# Patient Record
Sex: MALE | Race: WHITE | Employment: OTHER | URBAN - NONMETROPOLITAN AREA
[De-identification: names, ages, dates, MRNs, and addresses within clinical notes are randomized per-mention and may not be internally consistent; named-entity substitution may affect disease eponyms.]

---

## 2022-03-18 ENCOUNTER — HOSPITAL ENCOUNTER (OUTPATIENT)
Age: 77
Setting detail: OBSERVATION
Discharge: HOME | End: 2022-03-21
Attending: ORTHOPAEDIC SURGERY | Admitting: ORTHOPAEDIC SURGERY
Payer: MEDICARE

## 2022-03-18 DIAGNOSIS — M54.16 SPINAL STENOSIS OF LUMBAR REGION WITH RADICULOPATHY: ICD-10-CM

## 2022-03-18 DIAGNOSIS — M48.061 SPINAL STENOSIS OF LUMBAR REGION WITH RADICULOPATHY: ICD-10-CM

## 2022-03-18 DIAGNOSIS — M54.9 INTRACTABLE BACK PAIN: Primary | ICD-10-CM

## 2022-03-18 LAB
ANION GAP SERPL CALCULATED.3IONS-SCNC: 14 MEQ/L (ref 8–16)
BUN BLDV-MCNC: 19 MG/DL (ref 7–22)
CALCIUM SERPL-MCNC: 9.3 MG/DL (ref 8.5–10.5)
CHLORIDE BLD-SCNC: 97 MEQ/L (ref 98–111)
CO2: 21 MEQ/L (ref 23–33)
CREAT SERPL-MCNC: 0.8 MG/DL (ref 0.4–1.2)
GFR SERPL CREATININE-BSD FRML MDRD: > 90 ML/MIN/1.73M2
GLUCOSE BLD-MCNC: 117 MG/DL (ref 70–108)
POTASSIUM SERPL-SCNC: 3.8 MEQ/L (ref 3.5–5.2)
SODIUM BLD-SCNC: 132 MEQ/L (ref 135–145)

## 2022-03-18 PROCEDURE — 80048 BASIC METABOLIC PNL TOTAL CA: CPT

## 2022-03-18 PROCEDURE — 36415 COLL VENOUS BLD VENIPUNCTURE: CPT

## 2022-03-18 PROCEDURE — G0379 DIRECT REFER HOSPITAL OBSERV: HCPCS

## 2022-03-18 PROCEDURE — 2580000003 HC RX 258: Performed by: PHYSICIAN ASSISTANT

## 2022-03-18 PROCEDURE — 6370000000 HC RX 637 (ALT 250 FOR IP): Performed by: PHYSICIAN ASSISTANT

## 2022-03-18 PROCEDURE — G0378 HOSPITAL OBSERVATION PER HR: HCPCS

## 2022-03-18 RX ORDER — OXYCODONE HYDROCHLORIDE 5 MG/1
10 TABLET ORAL EVERY 4 HOURS PRN
Status: DISCONTINUED | OUTPATIENT
Start: 2022-03-18 | End: 2022-03-21 | Stop reason: HOSPADM

## 2022-03-18 RX ORDER — OXYCODONE HYDROCHLORIDE AND ACETAMINOPHEN 5; 325 MG/1; MG/1
1 TABLET ORAL EVERY 4 HOURS PRN
Status: ON HOLD | COMMUNITY
End: 2022-03-21 | Stop reason: SDUPTHER

## 2022-03-18 RX ORDER — POLYETHYLENE GLYCOL 3350 17 G/17G
17 POWDER, FOR SOLUTION ORAL DAILY
Status: DISCONTINUED | OUTPATIENT
Start: 2022-03-18 | End: 2022-03-21 | Stop reason: HOSPADM

## 2022-03-18 RX ORDER — HYDROCHLOROTHIAZIDE 25 MG/1
12.5 TABLET ORAL DAILY
Status: DISCONTINUED | OUTPATIENT
Start: 2022-03-18 | End: 2022-03-21 | Stop reason: HOSPADM

## 2022-03-18 RX ORDER — SIMVASTATIN 10 MG
10 TABLET ORAL NIGHTLY
COMMUNITY

## 2022-03-18 RX ORDER — OXYCODONE HYDROCHLORIDE 5 MG/1
5 TABLET ORAL EVERY 4 HOURS PRN
Status: DISCONTINUED | OUTPATIENT
Start: 2022-03-18 | End: 2022-03-21 | Stop reason: HOSPADM

## 2022-03-18 RX ORDER — LISINOPRIL 10 MG/1
10 TABLET ORAL DAILY
Status: DISCONTINUED | OUTPATIENT
Start: 2022-03-18 | End: 2022-03-21 | Stop reason: HOSPADM

## 2022-03-18 RX ORDER — ATORVASTATIN CALCIUM 10 MG/1
10 TABLET, FILM COATED ORAL DAILY
Status: DISCONTINUED | OUTPATIENT
Start: 2022-03-18 | End: 2022-03-21 | Stop reason: HOSPADM

## 2022-03-18 RX ORDER — ONDANSETRON 4 MG/1
4 TABLET, ORALLY DISINTEGRATING ORAL EVERY 8 HOURS PRN
Status: DISCONTINUED | OUTPATIENT
Start: 2022-03-18 | End: 2022-03-21 | Stop reason: HOSPADM

## 2022-03-18 RX ORDER — SODIUM CHLORIDE 9 MG/ML
25 INJECTION, SOLUTION INTRAVENOUS PRN
Status: DISCONTINUED | OUTPATIENT
Start: 2022-03-18 | End: 2022-03-21 | Stop reason: HOSPADM

## 2022-03-18 RX ORDER — CYCLOBENZAPRINE HCL 10 MG
10 TABLET ORAL 3 TIMES DAILY PRN
Status: ON HOLD | COMMUNITY
End: 2022-03-21 | Stop reason: SDUPTHER

## 2022-03-18 RX ORDER — LISINOPRIL AND HYDROCHLOROTHIAZIDE 12.5; 1 MG/1; MG/1
1 TABLET ORAL DAILY
COMMUNITY

## 2022-03-18 RX ORDER — MORPHINE SULFATE 2 MG/ML
2 INJECTION, SOLUTION INTRAMUSCULAR; INTRAVENOUS
Status: DISCONTINUED | OUTPATIENT
Start: 2022-03-18 | End: 2022-03-21 | Stop reason: HOSPADM

## 2022-03-18 RX ORDER — POLYETHYLENE GLYCOL 3350 17 G/17G
17 POWDER, FOR SOLUTION ORAL DAILY PRN
Status: DISCONTINUED | OUTPATIENT
Start: 2022-03-18 | End: 2022-03-21 | Stop reason: HOSPADM

## 2022-03-18 RX ORDER — CYCLOBENZAPRINE HCL 10 MG
10 TABLET ORAL 3 TIMES DAILY PRN
Status: DISCONTINUED | OUTPATIENT
Start: 2022-03-18 | End: 2022-03-21 | Stop reason: HOSPADM

## 2022-03-18 RX ORDER — SODIUM CHLORIDE 0.9 % (FLUSH) 0.9 %
5-40 SYRINGE (ML) INJECTION EVERY 12 HOURS SCHEDULED
Status: DISCONTINUED | OUTPATIENT
Start: 2022-03-18 | End: 2022-03-21 | Stop reason: HOSPADM

## 2022-03-18 RX ORDER — ONDANSETRON 2 MG/ML
4 INJECTION INTRAMUSCULAR; INTRAVENOUS EVERY 6 HOURS PRN
Status: DISCONTINUED | OUTPATIENT
Start: 2022-03-18 | End: 2022-03-21 | Stop reason: HOSPADM

## 2022-03-18 RX ORDER — LISINOPRIL AND HYDROCHLOROTHIAZIDE 12.5; 1 MG/1; MG/1
1 TABLET ORAL DAILY
Status: DISCONTINUED | OUTPATIENT
Start: 2022-03-18 | End: 2022-03-18 | Stop reason: CLARIF

## 2022-03-18 RX ORDER — ACETAMINOPHEN 325 MG/1
650 TABLET ORAL EVERY 6 HOURS
Status: DISCONTINUED | OUTPATIENT
Start: 2022-03-18 | End: 2022-03-21 | Stop reason: HOSPADM

## 2022-03-18 RX ORDER — SODIUM CHLORIDE 0.9 % (FLUSH) 0.9 %
5-40 SYRINGE (ML) INJECTION PRN
Status: DISCONTINUED | OUTPATIENT
Start: 2022-03-18 | End: 2022-03-21 | Stop reason: HOSPADM

## 2022-03-18 RX ORDER — IBUPROFEN 400 MG/1
400 TABLET ORAL EVERY 6 HOURS PRN
COMMUNITY

## 2022-03-18 RX ORDER — MORPHINE SULFATE 4 MG/ML
4 INJECTION, SOLUTION INTRAMUSCULAR; INTRAVENOUS
Status: DISCONTINUED | OUTPATIENT
Start: 2022-03-18 | End: 2022-03-21 | Stop reason: HOSPADM

## 2022-03-18 RX ADMIN — POLYETHYLENE GLYCOL 3350 17 G: 17 POWDER, FOR SOLUTION ORAL at 21:37

## 2022-03-18 RX ADMIN — OXYCODONE 10 MG: 5 TABLET ORAL at 23:07

## 2022-03-18 RX ADMIN — ATORVASTATIN CALCIUM 10 MG: 10 TABLET, FILM COATED ORAL at 21:37

## 2022-03-18 RX ADMIN — SODIUM CHLORIDE, PRESERVATIVE FREE 10 ML: 5 INJECTION INTRAVENOUS at 21:37

## 2022-03-18 ASSESSMENT — ENCOUNTER SYMPTOMS
BACK PAIN: 1
GASTROINTESTINAL NEGATIVE: 1
RESPIRATORY NEGATIVE: 1

## 2022-03-18 ASSESSMENT — PAIN DESCRIPTION - PAIN TYPE: TYPE: ACUTE PAIN;CHRONIC PAIN

## 2022-03-18 ASSESSMENT — PAIN DESCRIPTION - ORIENTATION: ORIENTATION: LOWER;MID

## 2022-03-18 ASSESSMENT — PAIN SCALES - GENERAL
PAINLEVEL_OUTOF10: 8
PAINLEVEL_OUTOF10: 8
PAINLEVEL_OUTOF10: 7

## 2022-03-18 ASSESSMENT — PAIN - FUNCTIONAL ASSESSMENT: PAIN_FUNCTIONAL_ASSESSMENT: ACTIVITIES ARE NOT PREVENTED

## 2022-03-18 ASSESSMENT — PAIN DESCRIPTION - PROGRESSION: CLINICAL_PROGRESSION: NOT CHANGED

## 2022-03-18 ASSESSMENT — PAIN DESCRIPTION - FREQUENCY: FREQUENCY: CONTINUOUS

## 2022-03-18 ASSESSMENT — PAIN DESCRIPTION - DESCRIPTORS: DESCRIPTORS: ACHING

## 2022-03-18 ASSESSMENT — PAIN DESCRIPTION - ONSET: ONSET: ON-GOING

## 2022-03-18 ASSESSMENT — PAIN DESCRIPTION - LOCATION: LOCATION: BACK

## 2022-03-19 ENCOUNTER — APPOINTMENT (OUTPATIENT)
Dept: MRI IMAGING | Age: 77
End: 2022-03-19
Attending: ORTHOPAEDIC SURGERY
Payer: MEDICARE

## 2022-03-19 PROCEDURE — G0378 HOSPITAL OBSERVATION PER HR: HCPCS

## 2022-03-19 PROCEDURE — 6370000000 HC RX 637 (ALT 250 FOR IP): Performed by: PHYSICIAN ASSISTANT

## 2022-03-19 PROCEDURE — 2580000003 HC RX 258: Performed by: PHYSICIAN ASSISTANT

## 2022-03-19 PROCEDURE — 6360000002 HC RX W HCPCS: Performed by: PHYSICIAN ASSISTANT

## 2022-03-19 PROCEDURE — 96372 THER/PROPH/DIAG INJ SC/IM: CPT

## 2022-03-19 PROCEDURE — 72148 MRI LUMBAR SPINE W/O DYE: CPT

## 2022-03-19 RX ADMIN — POLYETHYLENE GLYCOL 3350 17 G: 17 POWDER, FOR SOLUTION ORAL at 09:24

## 2022-03-19 RX ADMIN — ATORVASTATIN CALCIUM 10 MG: 10 TABLET, FILM COATED ORAL at 09:24

## 2022-03-19 RX ADMIN — ACETAMINOPHEN 650 MG: 325 TABLET ORAL at 01:41

## 2022-03-19 RX ADMIN — OXYCODONE 5 MG: 5 TABLET ORAL at 16:01

## 2022-03-19 RX ADMIN — SODIUM CHLORIDE, PRESERVATIVE FREE 10 ML: 5 INJECTION INTRAVENOUS at 20:32

## 2022-03-19 RX ADMIN — ACETAMINOPHEN 650 MG: 325 TABLET ORAL at 09:24

## 2022-03-19 RX ADMIN — NALOXEGOL OXALATE 12.5 MG: 12.5 TABLET, FILM COATED ORAL at 09:24

## 2022-03-19 RX ADMIN — SODIUM CHLORIDE, PRESERVATIVE FREE 10 ML: 5 INJECTION INTRAVENOUS at 09:25

## 2022-03-19 RX ADMIN — LISINOPRIL 10 MG: 10 TABLET ORAL at 09:24

## 2022-03-19 RX ADMIN — OXYCODONE 5 MG: 5 TABLET ORAL at 20:32

## 2022-03-19 RX ADMIN — CYCLOBENZAPRINE 10 MG: 10 TABLET, FILM COATED ORAL at 09:24

## 2022-03-19 RX ADMIN — ENOXAPARIN SODIUM 40 MG: 100 INJECTION SUBCUTANEOUS at 09:24

## 2022-03-19 RX ADMIN — HYDROCHLOROTHIAZIDE 12.5 MG: 25 TABLET ORAL at 09:24

## 2022-03-19 RX ADMIN — ACETAMINOPHEN 650 MG: 325 TABLET ORAL at 19:19

## 2022-03-19 RX ADMIN — ACETAMINOPHEN 650 MG: 325 TABLET ORAL at 13:30

## 2022-03-19 ASSESSMENT — PAIN DESCRIPTION - DESCRIPTORS
DESCRIPTORS: ACHING
DESCRIPTORS: ACHING

## 2022-03-19 ASSESSMENT — PAIN DESCRIPTION - DIRECTION: RADIATING_TOWARDS: DOWN LEG

## 2022-03-19 ASSESSMENT — PAIN DESCRIPTION - LOCATION
LOCATION: HIP
LOCATION: BACK

## 2022-03-19 ASSESSMENT — PAIN DESCRIPTION - PROGRESSION
CLINICAL_PROGRESSION: NOT CHANGED

## 2022-03-19 ASSESSMENT — PAIN DESCRIPTION - FREQUENCY
FREQUENCY: INTERMITTENT
FREQUENCY: INTERMITTENT

## 2022-03-19 ASSESSMENT — PAIN DESCRIPTION - ONSET
ONSET: ON-GOING
ONSET: ON-GOING

## 2022-03-19 ASSESSMENT — PAIN SCALES - GENERAL
PAINLEVEL_OUTOF10: 5
PAINLEVEL_OUTOF10: 6
PAINLEVEL_OUTOF10: 2
PAINLEVEL_OUTOF10: 3
PAINLEVEL_OUTOF10: 4
PAINLEVEL_OUTOF10: 5
PAINLEVEL_OUTOF10: 6
PAINLEVEL_OUTOF10: 3
PAINLEVEL_OUTOF10: 1
PAINLEVEL_OUTOF10: 6
PAINLEVEL_OUTOF10: 6

## 2022-03-19 ASSESSMENT — PAIN - FUNCTIONAL ASSESSMENT: PAIN_FUNCTIONAL_ASSESSMENT: ACTIVITIES ARE NOT PREVENTED

## 2022-03-19 ASSESSMENT — PAIN DESCRIPTION - ORIENTATION
ORIENTATION: RIGHT

## 2022-03-19 ASSESSMENT — PAIN DESCRIPTION - PAIN TYPE
TYPE: ACUTE PAIN

## 2022-03-19 NOTE — PROGRESS NOTES
Department of Orthopedic Surgery  Spine Service  Progress Note        Subjective:   3/19/22  Salbador is resting in bed. Pain better controlled this morning with pain medications. Pain worse with standing and ambulation. Await MRI LS completion to recommend appropriate injection for patient. Vitals  VITALS:  /77   Pulse 86   Temp 98.4 °F (36.9 °C) (Oral)   Resp 18   Ht 5' 10\" (1.778 m)   Wt 174 lb (78.9 kg)   SpO2 96%   BMI 24.97 kg/m²   24HR INTAKE/OUTPUT:    Intake/Output Summary (Last 24 hours) at 3/19/2022 8294  Last data filed at 3/19/2022 0346  Gross per 24 hour   Intake 555 ml   Output 500 ml   Net 55 ml     URINARY CATHETER OUTPUT (Bartholomew):     DRAIN/TUBE OUTPUT:     VENT SETTINGS:  Vent Information  SpO2: 96 %  Additional Respiratory  Assessments  Pulse: 86  Resp: 18  SpO2: 96 %        PHYSICAL EXAM:    Orientation:  alert and oriented to person, place and time    Lower Extremity Motor :    quadriceps, plantarflexion 5/5 bilateral  extensor hallucis longus, dorsiflexion 4+/5 right & 5/5 left  Lower Extremity Sensory:  Intact L1-S1    ABNORMAL EXAM FINDINGS:  none    LABS:  No results for input(s): HGB, HCT in the last 72 hours. ASSESSMENT AND PLAN:    Intractable LBP with RLE radiculopathy    1:  Monitor labs and vitals  2:  Activity Level:  OOB with therapy and staff  3:  Pain Control:  Controlled, continue current regimen  4:  Discharge Planning:  Pending clinical course.      Electronically signed by Avila Fernandez PA-C on 3/19/2022 at 9:20 AM

## 2022-03-19 NOTE — PLAN OF CARE
Care plan initiated. Care plan reviewed with patient. Patient verbalizes understanding of the plan of care and contribute to goal setting.

## 2022-03-19 NOTE — PLAN OF CARE
Problem: Pain:    Goal: Control of acute pain    Description: Control of acute pain    Outcome: Ongoing     Pt pain controlled with medications/ambulation and informing staff ahead of time when pain is noticeable to prevent pain from becoming worse.

## 2022-03-20 PROCEDURE — 94760 N-INVAS EAR/PLS OXIMETRY 1: CPT

## 2022-03-20 PROCEDURE — 2580000003 HC RX 258: Performed by: PHYSICIAN ASSISTANT

## 2022-03-20 PROCEDURE — 6370000000 HC RX 637 (ALT 250 FOR IP): Performed by: PHYSICIAN ASSISTANT

## 2022-03-20 PROCEDURE — G0378 HOSPITAL OBSERVATION PER HR: HCPCS

## 2022-03-20 PROCEDURE — 96372 THER/PROPH/DIAG INJ SC/IM: CPT

## 2022-03-20 PROCEDURE — 6360000002 HC RX W HCPCS: Performed by: PHYSICIAN ASSISTANT

## 2022-03-20 RX ADMIN — NALOXEGOL OXALATE 12.5 MG: 12.5 TABLET, FILM COATED ORAL at 09:11

## 2022-03-20 RX ADMIN — SODIUM CHLORIDE, PRESERVATIVE FREE 10 ML: 5 INJECTION INTRAVENOUS at 09:18

## 2022-03-20 RX ADMIN — SODIUM CHLORIDE, PRESERVATIVE FREE 10 ML: 5 INJECTION INTRAVENOUS at 19:38

## 2022-03-20 RX ADMIN — ACETAMINOPHEN 650 MG: 325 TABLET ORAL at 08:03

## 2022-03-20 RX ADMIN — ACETAMINOPHEN 650 MG: 325 TABLET ORAL at 13:28

## 2022-03-20 RX ADMIN — ENOXAPARIN SODIUM 40 MG: 100 INJECTION SUBCUTANEOUS at 09:13

## 2022-03-20 RX ADMIN — HYDROCHLOROTHIAZIDE 12.5 MG: 25 TABLET ORAL at 09:12

## 2022-03-20 RX ADMIN — POLYETHYLENE GLYCOL 3350 17 G: 17 POWDER, FOR SOLUTION ORAL at 09:10

## 2022-03-20 RX ADMIN — ATORVASTATIN CALCIUM 10 MG: 10 TABLET, FILM COATED ORAL at 09:13

## 2022-03-20 RX ADMIN — ACETAMINOPHEN 650 MG: 325 TABLET ORAL at 01:29

## 2022-03-20 RX ADMIN — OXYCODONE 10 MG: 5 TABLET ORAL at 04:15

## 2022-03-20 RX ADMIN — ACETAMINOPHEN 650 MG: 325 TABLET ORAL at 19:37

## 2022-03-20 RX ADMIN — LISINOPRIL 10 MG: 10 TABLET ORAL at 09:12

## 2022-03-20 ASSESSMENT — PAIN DESCRIPTION - PAIN TYPE
TYPE: CHRONIC PAIN
TYPE: CHRONIC PAIN
TYPE: ACUTE PAIN
TYPE: ACUTE PAIN

## 2022-03-20 ASSESSMENT — PAIN DESCRIPTION - PROGRESSION

## 2022-03-20 ASSESSMENT — PAIN SCALES - GENERAL
PAINLEVEL_OUTOF10: 3
PAINLEVEL_OUTOF10: 4
PAINLEVEL_OUTOF10: 3
PAINLEVEL_OUTOF10: 5
PAINLEVEL_OUTOF10: 3
PAINLEVEL_OUTOF10: 7
PAINLEVEL_OUTOF10: 4
PAINLEVEL_OUTOF10: 3
PAINLEVEL_OUTOF10: 5
PAINLEVEL_OUTOF10: 6

## 2022-03-20 ASSESSMENT — PAIN DESCRIPTION - LOCATION
LOCATION: BACK
LOCATION: HIP

## 2022-03-20 ASSESSMENT — PAIN DESCRIPTION - ORIENTATION
ORIENTATION: RIGHT

## 2022-03-20 ASSESSMENT — PAIN DESCRIPTION - FREQUENCY: FREQUENCY: INTERMITTENT

## 2022-03-20 ASSESSMENT — PAIN DESCRIPTION - DESCRIPTORS: DESCRIPTORS: DISCOMFORT

## 2022-03-20 NOTE — PROGRESS NOTES
Department of Orthopedic Surgery  Spine Service  Progress Note        Subjective:   3/19/22  Salbador is resting in bed. Pain better controlled this morning with pain medications. Pain worse with standing and ambulation. Await MRI LS completion to recommend appropriate injection for patient. 3/20/22  Salbador is resting in bed. No overnight changes. Continued RLE radiculopathy worsened with standing and ambulation. Patient wants to trial an injection. MRI completed last night:  1. Mild to moderate spinal canal stenosis with moderate severity bilateral foraminal stenosis at the L2-3 level due to degenerative changes. 2. Mild spinal canal stenosis with moderate severity left foraminal stenosis at the L3-4 level. 3. Severe right foraminal stenosis at the L4-5 level. Vitals  VITALS:  BP (!) 142/65   Pulse 83   Temp 97.7 °F (36.5 °C) (Oral)   Resp 14   Ht 5' 10\" (1.778 m)   Wt 174 lb (78.9 kg)   SpO2 97%   BMI 24.97 kg/m²   24HR INTAKE/OUTPUT:      Intake/Output Summary (Last 24 hours) at 3/20/2022 2038  Last data filed at 3/20/2022 9384  Gross per 24 hour   Intake 1135 ml   Output 750 ml   Net 385 ml     URINARY CATHETER OUTPUT (Bartholomew):     DRAIN/TUBE OUTPUT:     VENT SETTINGS:  Vent Information  SpO2: 97 %  Additional Respiratory  Assessments  Pulse: 83  Resp: 14  SpO2: 97 %  Oral Care: Teeth brushed,Mouthwash        PHYSICAL EXAM:    Orientation:  alert and oriented to person, place and time    Lower Extremity Motor :    quadriceps, plantarflexion 5/5 bilateral  extensor hallucis longus, dorsiflexion 4+/5 right & 5/5 left  Lower Extremity Sensory:  Intact L1-S1    ABNORMAL EXAM FINDINGS:  none    LABS:  No results for input(s): HGB, HCT in the last 72 hours.     ASSESSMENT AND PLAN:    Intractable LBP with RLE radiculopathy    1:  Monitor labs and vitals  2:  Activity Level:  OOB with therapy and staff  3:  Pain Control:  Controlled, continue current regimen  4:  Discharge Planning:  Pending clinical course.    5:  Will order a right L4-L5 EFRNB with IR    Electronically signed by Santy Issa PA-C on 3/20/2022 at 9:27 AM

## 2022-03-20 NOTE — PLAN OF CARE
Problem: GI    Goal: No bowel complications    Outcome: Ongoing    Pt with previous complaints of constipation. Pt administered scheduled bowel regimen. Pt awaiting bowel movement while frequently using restroom.

## 2022-03-20 NOTE — PLAN OF CARE
Problem: Pain:  Goal: Pain level will decrease  Description: Pain level will decrease  Outcome: Ongoing  Note: Patient continues to c/o pain at 5 or 6. Patient repositioned and rest effective. Routine tylenol effective. Goal: Control of acute pain  Description: Control of acute pain  Outcome: Ongoing  Goal: Control of chronic pain  Description: Control of chronic pain  Outcome: Ongoing     Problem: Falls - Risk of:  Goal: Will remain free from falls  Description: Will remain free from falls  Outcome: Ongoing  Note: Patient ambulates with slow steady gait. Patient steady with walker. Goal: Absence of physical injury  Description: Absence of physical injury  Outcome: Ongoing     Problem: Daily Care:  Goal: Daily care needs are met  Description: Daily care needs are met  Outcome: Ongoing     Problem: Discharge Planning:  Goal: Patients continuum of care needs are met  Description: Patients continuum of care needs are met  Outcome: Ongoing     Problem: Neurological  Goal: Maximum potential motor/sensory/cognitive function  Outcome: Ongoing  Note: Pending injections for 03/21/22     Problem: Cardiovascular  Goal: No DVT, peripheral vascular complications  Outcome: Ongoing     Problem: Respiratory  Goal: O2 Sat > 90%  Outcome: Ongoing     Problem: GI  Goal: No bowel complications  2/98/3743 1511 by Manny Nugent LPN  Outcome: Ongoing  Note: Patient given bowel meds and no BM noted this shift. Active BS noted.   3/20/2022 8669 by Joana Cantu RN  Outcome: Ongoing     Problem:   Goal: Adequate urinary output  Outcome: Ongoing     Problem: Nutrition  Goal: Optimal nutrition therapy  Outcome: Ongoing     Problem: Skin Integrity/Risk  Goal: No skin breakdown during hospitalization  Outcome: Ongoing     Problem: Musculor/Skeletal Functional Status  Goal: Highest potential functional level  Outcome: Ongoing  Care plan reviewed with patient and  Patient  verbalizes understanding of the plan of care and contribute to goal setting.

## 2022-03-21 ENCOUNTER — APPOINTMENT (OUTPATIENT)
Dept: INTERVENTIONAL RADIOLOGY/VASCULAR | Age: 77
End: 2022-03-21
Attending: ORTHOPAEDIC SURGERY
Payer: MEDICARE

## 2022-03-21 VITALS
RESPIRATION RATE: 18 BRPM | TEMPERATURE: 97.8 F | OXYGEN SATURATION: 93 % | SYSTOLIC BLOOD PRESSURE: 140 MMHG | WEIGHT: 174 LBS | HEART RATE: 92 BPM | HEIGHT: 70 IN | DIASTOLIC BLOOD PRESSURE: 74 MMHG | BODY MASS INDEX: 24.91 KG/M2

## 2022-03-21 PROCEDURE — 2709999900 IR FLUORO GUIDED LUMBAR PUNCTURE THERAPY

## 2022-03-21 PROCEDURE — G0378 HOSPITAL OBSERVATION PER HR: HCPCS

## 2022-03-21 PROCEDURE — 6370000000 HC RX 637 (ALT 250 FOR IP): Performed by: PHYSICIAN ASSISTANT

## 2022-03-21 PROCEDURE — 6360000002 HC RX W HCPCS: Performed by: RADIOLOGY

## 2022-03-21 PROCEDURE — 64483 NJX AA&/STRD TFRM EPI L/S 1: CPT

## 2022-03-21 PROCEDURE — 96372 THER/PROPH/DIAG INJ SC/IM: CPT

## 2022-03-21 PROCEDURE — 97530 THERAPEUTIC ACTIVITIES: CPT

## 2022-03-21 PROCEDURE — 97116 GAIT TRAINING THERAPY: CPT

## 2022-03-21 PROCEDURE — 96374 THER/PROPH/DIAG INJ IV PUSH: CPT

## 2022-03-21 PROCEDURE — 2580000003 HC RX 258: Performed by: PHYSICIAN ASSISTANT

## 2022-03-21 PROCEDURE — 62323 NJX INTERLAMINAR LMBR/SAC: CPT

## 2022-03-21 PROCEDURE — 6360000004 HC RX CONTRAST MEDICATION: Performed by: RADIOLOGY

## 2022-03-21 PROCEDURE — 6360000002 HC RX W HCPCS: Performed by: PHYSICIAN ASSISTANT

## 2022-03-21 PROCEDURE — 2500000003 HC RX 250 WO HCPCS: Performed by: RADIOLOGY

## 2022-03-21 PROCEDURE — 97161 PT EVAL LOW COMPLEX 20 MIN: CPT

## 2022-03-21 RX ORDER — SODIUM PHOSPHATE, DIBASIC AND SODIUM PHOSPHATE, MONOBASIC 7; 19 G/133ML; G/133ML
1 ENEMA RECTAL
Status: DISCONTINUED | OUTPATIENT
Start: 2022-03-21 | End: 2022-03-21 | Stop reason: HOSPADM

## 2022-03-21 RX ORDER — POLYETHYLENE GLYCOL 3350 17 G/17G
17 POWDER, FOR SOLUTION ORAL DAILY
Qty: 527 G | Refills: 0 | Status: SHIPPED | OUTPATIENT
Start: 2022-03-21 | End: 2022-04-20

## 2022-03-21 RX ORDER — CYCLOBENZAPRINE HCL 10 MG
10 TABLET ORAL 3 TIMES DAILY PRN
Qty: 50 TABLET | Refills: 0 | Status: SHIPPED | OUTPATIENT
Start: 2022-03-21

## 2022-03-21 RX ORDER — SENNA AND DOCUSATE SODIUM 50; 8.6 MG/1; MG/1
2 TABLET, FILM COATED ORAL DAILY PRN
Status: DISCONTINUED | OUTPATIENT
Start: 2022-03-21 | End: 2022-03-21 | Stop reason: HOSPADM

## 2022-03-21 RX ORDER — BISACODYL 10 MG
10 SUPPOSITORY, RECTAL RECTAL ONCE
Status: DISCONTINUED | OUTPATIENT
Start: 2022-03-21 | End: 2022-03-21 | Stop reason: HOSPADM

## 2022-03-21 RX ORDER — BUPIVACAINE HYDROCHLORIDE 2.5 MG/ML
5 INJECTION, SOLUTION EPIDURAL; INFILTRATION; INTRACAUDAL ONCE
Status: COMPLETED | OUTPATIENT
Start: 2022-03-21 | End: 2022-03-21

## 2022-03-21 RX ORDER — OXYCODONE HYDROCHLORIDE AND ACETAMINOPHEN 5; 325 MG/1; MG/1
1 TABLET ORAL EVERY 4 HOURS PRN
Qty: 42 TABLET | Refills: 0 | Status: SHIPPED | OUTPATIENT
Start: 2022-03-21 | End: 2022-03-28

## 2022-03-21 RX ORDER — DEXAMETHASONE SODIUM PHOSPHATE 4 MG/ML
4 INJECTION, SOLUTION INTRA-ARTICULAR; INTRALESIONAL; INTRAMUSCULAR; INTRAVENOUS; SOFT TISSUE ONCE
Status: COMPLETED | OUTPATIENT
Start: 2022-03-21 | End: 2022-03-21

## 2022-03-21 RX ORDER — SENNA AND DOCUSATE SODIUM 50; 8.6 MG/1; MG/1
2 TABLET, FILM COATED ORAL DAILY PRN
Qty: 60 TABLET | Refills: 0 | Status: SHIPPED | OUTPATIENT
Start: 2022-03-21

## 2022-03-21 RX ADMIN — POLYETHYLENE GLYCOL 3350 17 G: 17 POWDER, FOR SOLUTION ORAL at 09:19

## 2022-03-21 RX ADMIN — SODIUM CHLORIDE, PRESERVATIVE FREE 10 ML: 5 INJECTION INTRAVENOUS at 09:19

## 2022-03-21 RX ADMIN — ACETAMINOPHEN 650 MG: 325 TABLET ORAL at 01:45

## 2022-03-21 RX ADMIN — NALOXEGOL OXALATE 12.5 MG: 12.5 TABLET, FILM COATED ORAL at 09:18

## 2022-03-21 RX ADMIN — ATORVASTATIN CALCIUM 10 MG: 10 TABLET, FILM COATED ORAL at 09:19

## 2022-03-21 RX ADMIN — ACETAMINOPHEN 650 MG: 325 TABLET ORAL at 09:19

## 2022-03-21 RX ADMIN — DOCUSATE SODIUM 50MG AND SENNOSIDES 8.6MG 2 TABLET: 8.6; 5 TABLET, FILM COATED ORAL at 09:20

## 2022-03-21 RX ADMIN — LISINOPRIL 10 MG: 10 TABLET ORAL at 09:19

## 2022-03-21 RX ADMIN — ACETAMINOPHEN 650 MG: 325 TABLET ORAL at 14:06

## 2022-03-21 RX ADMIN — HYDROCHLOROTHIAZIDE 12.5 MG: 25 TABLET ORAL at 09:18

## 2022-03-21 RX ADMIN — MORPHINE SULFATE 4 MG: 4 INJECTION, SOLUTION INTRAMUSCULAR; INTRAVENOUS at 03:14

## 2022-03-21 RX ADMIN — BUPIVACAINE HYDROCHLORIDE 2.5 MG: 2.5 INJECTION, SOLUTION EPIDURAL; INFILTRATION; INTRACAUDAL; PERINEURAL at 09:39

## 2022-03-21 RX ADMIN — DEXAMETHASONE SODIUM PHOSPHATE 4 MG: 4 INJECTION, SOLUTION INTRA-ARTICULAR; INTRALESIONAL; INTRAMUSCULAR; INTRAVENOUS; SOFT TISSUE at 09:39

## 2022-03-21 RX ADMIN — IOHEXOL 1 ML: 180 INJECTION INTRAVENOUS at 09:39

## 2022-03-21 ASSESSMENT — PAIN DESCRIPTION - PAIN TYPE
TYPE: CHRONIC PAIN
TYPE: CHRONIC PAIN

## 2022-03-21 ASSESSMENT — PAIN SCALES - GENERAL
PAINLEVEL_OUTOF10: 4
PAINLEVEL_OUTOF10: 6
PAINLEVEL_OUTOF10: 4
PAINLEVEL_OUTOF10: 7
PAINLEVEL_OUTOF10: 6
PAINLEVEL_OUTOF10: 7
PAINLEVEL_OUTOF10: 6
PAINLEVEL_OUTOF10: 5
PAINLEVEL_OUTOF10: 6
PAINLEVEL_OUTOF10: 7

## 2022-03-21 ASSESSMENT — PAIN DESCRIPTION - PROGRESSION
CLINICAL_PROGRESSION: NOT CHANGED

## 2022-03-21 ASSESSMENT — PAIN DESCRIPTION - LOCATION
LOCATION: BACK
LOCATION: HIP;LEG
LOCATION: BACK

## 2022-03-21 ASSESSMENT — PAIN DESCRIPTION - ORIENTATION
ORIENTATION: RIGHT

## 2022-03-21 NOTE — PROGRESS NOTES
Department of Orthopedic Surgery  Spine Service  Progress Note        Subjective:   3/19/22  Salbador is resting in bed. Pain better controlled this morning with pain medications. Pain worse with standing and ambulation. Await MRI LS completion to recommend appropriate injection for patient. 3/20/22  Salbador is resting in bed. No overnight changes. Continued RLE radiculopathy worsened with standing and ambulation. Patient wants to trial an injection. MRI completed last night:  1. Mild to moderate spinal canal stenosis with moderate severity bilateral foraminal stenosis at the L2-3 level due to degenerative changes. 2. Mild spinal canal stenosis with moderate severity left foraminal stenosis at the L3-4 level. 3. Severe right foraminal stenosis at the L4-5 level.     3/21/22  Salbador is resting in bed. No overnight changes. Planning right L4-L5 EFNRB today with IR. Hold Lovenox. Potential discharge today after injection if symptoms significantly improved and ambulates well. Vitals  VITALS:  BP (!) 165/82   Pulse 72   Temp 98.1 °F (36.7 °C) (Oral)   Resp 16   Ht 5' 10\" (1.778 m)   Wt 174 lb (78.9 kg)   SpO2 95%   BMI 24.97 kg/m²   24HR INTAKE/OUTPUT:      Intake/Output Summary (Last 24 hours) at 3/21/2022 0446  Last data filed at 3/20/2022 1937  Gross per 24 hour   Intake 915 ml   Output --   Net 915 ml     URINARY CATHETER OUTPUT (Bartholomew):     DRAIN/TUBE OUTPUT:     VENT SETTINGS:  Vent Information  SpO2: 95 %  Additional Respiratory  Assessments  Pulse: 72  Resp: 16  SpO2: 95 %  Oral Care: Teeth brushed,Mouthwash        PHYSICAL EXAM:    Orientation:  alert and oriented to person, place and time    Lower Extremity Motor :    quadriceps, plantarflexion 5/5 bilateral  extensor hallucis longus, dorsiflexion 4+/5 right & 5/5 left  Lower Extremity Sensory:  Intact L1-S1    ABNORMAL EXAM FINDINGS:  none    LABS:  No results for input(s): HGB, HCT in the last 72 hours.     ASSESSMENT AND PLAN:    Intractable LBP with RLE radiculopathy    1:  Monitor labs and vitals  2:  Activity Level:  OOB with therapy and staff  3:  Pain Control:  Controlled, continue current regimen  4:  Discharge Planning:  Pending clinical course. Potential after injection if significantly improved and patient ambulating well.   5:  Will order a right L4-L5 EFRNB with IR today   6:  Work on BM, added stool softener and prn meds.      Electronically signed by Chivo Oates PA-C on 3/21/2022 at 6:52 AM

## 2022-03-21 NOTE — PLAN OF CARE
Problem: Pain:  Goal: Pain level will decrease  Description: Pain level will decrease  Outcome: Completed  Note: Pt report pain at 3/10 on scale. Pt states oral medication helping to achieve pain goal of a 2/10 on scale. Patient states he is feeling much better after his back injection today. Goal: Control of acute pain  Description: Control of acute pain  Outcome: Completed  Goal: Control of chronic pain  Description: Control of chronic pain  Outcome: Completed     Problem: Falls - Risk of:  Goal: Will remain free from falls  Description: Will remain free from falls  Outcome: Completed  Note: Pt using call light appropriately to call for assistance with ambulation to the bathroom and to chair. Pt is also compliant with use of non-skid slippers. Pt reports understanding of fall prevention when discussed. Goal: Absence of physical injury  Description: Absence of physical injury  Outcome: Completed     Problem: Daily Care:  Goal: Daily care needs are met  Description: Daily care needs are met  Outcome: Completed  Note: No new needs noted at this time. Patient is resting in bed with family present in room. Patient has call light in hand if he needs assistance. Problem: Discharge Planning:  Goal: Patients continuum of care needs are met  Description: Patients continuum of care needs are met  Outcome: Completed  Note: Pt plans to return home with wife at discharge. Care manager and  helping with discharge needs. No noticeable needs at this time. Problem: Neurological  Goal: Maximum potential motor/sensory/cognitive function  Outcome: Completed     Problem: Cardiovascular  Goal: No DVT, peripheral vascular complications  Outcome: Completed  Note: Pt without s/s of DVT. Pt able to take prescribed anticoagulants and able to ambulate to help prevent development of DVT. Problem: Respiratory  Goal: O2 Sat > 90%  Outcome: Completed  Note: Patient 02 stat has been at 93% or higher on room air. Patient states they have no difficulty breathing. Respirations easy. Problem: GI  Goal: No bowel complications  Outcome: Completed  Note: Pt with bowel sounds, passing passing flatus, and with nausea. Taking prescribed medications as needed to assist with BM. Problem:   Goal: Adequate urinary output  Outcome: Completed  Note: Pt voiding adequate amounts of clear yellow to hermelinda colored urine without difficulty. Problem: Nutrition  Goal: Optimal nutrition therapy  Outcome: Completed  Note: Patient is on a regular diet with no restrictions in place. Patient tolerates food and drink at this time. Problem: Skin Integrity/Risk  Goal: No skin breakdown during hospitalization  Outcome: Completed  Note: No skin breakdown noted at this time. Patient remains continent and able to readjust with positioning as he needs. Problem: Musculor/Skeletal Functional Status  Goal: Highest potential functional level  Outcome: Completed  Note: Patient is able to ambulate by himself as he needs. No decrease in musculoskeletal functional abilities at this time. Care plan reviewed with patient and wife. Patient and wife verbalize understanding of the plan of care and contribute to goal setting.

## 2022-03-21 NOTE — OP NOTE
Department of Radiology  Post Procedure Progress Note    Pre-Procedure Diagnosis:  Lumbar radiculopathy     Procedure Performed:  Epidural block/steroid injection      Anesthesia: local     Findings: successful    Immediate Complications:  None    Estimated Blood Loss: minimal    SEE DICTATED PROCEDURE NOTE FOR COMPLETE DETAILS.       Oneida Tee MD   3/21/2022 9:39 AM

## 2022-03-21 NOTE — CARE COORDINATION
3/21/22, 9:25 AM EDT  DISCHARGE PLANNING EVALUATION:    Wing Bartlett       [facility]   Location: 7[facility]10[facility]-A Reason for admit: Intractable back pain [M54.9]  Radiculopathy, lumbar region [M54.16]   PMH:  has a past medical history of Hyperlipidemia, Hypertension, and Movement disorder. Procedure: 3/21 Planned IR for injection   Barriers to Discharge:  Pain control. Await injection. PCP: Froylan Napier DO  Readmission Risk Score: 7.4 ( )%    Patient Goals/Plan/Treatment Preferences: Spoke with Vixloadinayn Ronel, he was very active prior. Did not use any dme. He plans to return home with his wife. His family has a walker for him if needed. Possible discharge later tonight pending condition post injection. Transportation/Food Security/Housekeeping Addressed:  No issues identified.

## 2022-03-21 NOTE — PROGRESS NOTES
Patrick Served Roxana Trammell regarding Dr. Nubia Dai order for discharge, so see if clear from orthopedics end. Radha Logan requested to ambulate patient, and hold his discharge at this time.

## 2022-03-21 NOTE — PROGRESS NOTES
Patient approved for discharge. Removed patients IV that was in the right forearm. Mohammed Mcardle approved for patient to continue his twice a week outpatient physical therapy sessions at [facility]. Also stated patient needed a 2-3 week out injection follow up appointment with Dr. Allie Kyle. Nurse called [facility] and scheduled follow up for Friday April 15 at 11:30AM. Brie Chavez over new medications that were being prescribed, and the paper scripts that were being given to patient. Patient and family stated that they had no further questions. Patient signed discharge paperwork. Put in a request for transport to come take patient to [facility].

## 2022-03-21 NOTE — PROGRESS NOTES
0930 Pt arrived in IR. C/O pain to right lower back, 7/10. C/o intermittent numbness/tingling to bilateral lower extremities, denies at this time. 9538 Procedure started with Dr. Blake Duncan  3233  Procedure complete. Pt tolerated well.   0946 Pt transported to [address] [place] [address] via bed. Skin pink, warm, dry. Respirations even and unlabored. Pain 7/10 . Pedal push/pull equal and strong. Pt denies c/o numbness/tingling to bilateral lower extremities at this time.

## 2022-03-21 NOTE — PROGRESS NOTES
[facility]  INPATIENT PHYSICAL THERAPY  EVALUATION  [facility] ORTHOPEDICS 7K - 7K-10/010-A    Time In: 9775  Time Out: 0910  Timed Code Treatment Minutes: 23 Minutes  Minutes: 34          Date: 3/21/2022  Patient Name: Howard Watt,  Gender:  male        MRN: [id number]  : 1945  (77 y.o.)      Referring Practitioner: Caty Billy PA-C  Diagnosis: intractable back pain  Additional Pertinent Hx: Per EMR \"Salbador is a 75y/o male who was seen in the office today with complaints of severe right leg pain. He states that these symptoms have acutely flared over the pas week and a half. The pain was instigated by him trying to move a dryer which caused a severe pain in the back and traveling in the right leg through the lateral thigh, lateral calf, and to the dorsum of the foot. He states that he has not been able to comfortably walk at all and presented to our office in a wheelchair. He had similar symptoms last summer with a flare of back and leg pain. He states that this current flare up is significantly worse, rating a 10/10 on the VAS scale. He has been treated by IM injections from his family provider. He was also seen two separate times at the [facility] emergency department. Updated CT has shown some foraminal encroachment and disc bulge at L3-4 and L4-5 on the right. He has had no change in bowel or bladder continence. No left leg symptoms at this time. He is only on medication for hyperlipidemia and hypertension. Due to his inability to walk and safely get around we have directly admitted him to the hospital for further workup and expected treatment. \"     Restrictions/Precautions:  Restrictions/Precautions: General Precautions,Fall Risk    Subjective:  Chart Reviewed: Yes  Patient assessed for rehabilitation services?: Yes  Family / Caregiver Present: No  Subjective: OK to see pt per nursing. Pt in bed when PT arrived, agreeable to PT session, requesting to use the restroom.  Pt reports he was recently at PT for his back pain and plans to return. Instructed pt in and nursing to amb to and from bathroom with A and no AD to progress with mobility. General:  Overall Orientation Status: Within Normal Limits  Follows Commands: Within Functional Limits    Vision: Impaired  Vision Exceptions: Wears glasses at all times    Hearing: Within functional limits         Pain: 5-6/10: R hip into R LE    Vitals: Vitals not assessed per clinical judgement, see nursing flowsheet    Social/Functional History:    Lives With: Spouse  Type of Home: House  Home Layout: One level (with basement)  Home Access: Stairs to enter without rails  Entrance Stairs - Number of Steps: 3 AMALIA  Home Equipment: Crutches     Bathroom Shower/Tub: Walk-in shower  Bathroom Toilet: Standard       ADL Assistance: Independent  Homemaking Assistance: Independent  Ambulation Assistance: Independent  Transfer Assistance: Independent    Active : Yes  Type of occupation: farmer  Additional Comments: Pt reports he is IND with functional tasks prior. Pt reports he has access to a walker if he were to need it. Wife is able to help assist as needed. OBJECTIVE:  Range of Motion:  Bilateral Lower Extremity: WNL    Strength:  Bilateral Lower Extremity: WNL    Balance:  Static Sitting Balance:  Independent  Dynamic Sitting Balance: Independent  Static Standing Balance: Supervision, Stand By Assistance  Dynamic Standing Balance: Supervision, Stand By Assistance  RW for support, completed toileting and self ajit care.  Pt completed hand washing at sink with no LOB noted, walker in safe distance  Bed Mobility:  Rolling to Left: Supervision   Supine to Sit: Supervision   good technique  Transfers:  Sit to Stand: Stand By Assistance, X 1  Stand to Sit:Stand By Assistance, X 1  RW for support  Ambulation:  Stand By Assistance, X 1, with cues for safety, with verbal cues , with increased time for completion  Distance: 500 feet  Surface: Level Tile  Device:Rolling Walker  Gait Deviations:  Slow Keila, Decreased Step Length Bilaterally, Decreased Weight Shift Right and Decreased Gait Speed  Cues for safety, no LOB noted with completion  Stairs:  Stand By Assistance, X 1  Number of Steps: 4 x2 trials  Height: 6\" step with Bilateral Handrails   reciprocal step pattern with first trial, step to pattern with descend, good sequencing    Functional Outcome Measures: Completed  AM-PAC Inpatient Mobility Raw Score : 20  AM-PAC Inpatient T-Scale Score : 47.67    ASSESSMENT:  Activity Tolerance:  Patient tolerance of  treatment: good. Treatment Initiated: Treatment and education initiated within context of evaluation. Evaluation time included review of current medical information, gathering information related to past medical, social and functional history, completion of standardized testing, formal and informal observation of tasks, assessment of data and development of plan of care and goals. Treatment time included skilled education and facilitation of tasks to increase safety and independence with functional mobility for improved independence and quality of life. Assessment: Body structures, Functions, Activity limitations: Decreased functional mobility ,Increased pain,Decreased endurance  Assessment: Pt demos increased back pain, cont to require 1 person assist for mobility tasks with use of RW for support. Pt cont to require skilled PT services to increase IND with functional tasks with use of LRAD for support to progress towards PLOF and decrease falls.   Prognosis: Good    REQUIRES PT FOLLOW UP: Yes    Discharge Recommendations:  Discharge Recommendations: Continue to assess pending progress,Outpatient PT,Home with assist PRN    Patient Education:  PT Education: Alfonso [employer] of [address] Mobility Training,Transfer Training,Equipment  Patient Education: d/c planning    Equipment Recommendations:  Equipment Needed: No    Plan:  Times per week: 3-5x O  Current Treatment Recommendations: Strengthening,Neuromuscular Re-education,Home Exercise Program,Safety Education & Kayren Herder Training,Patient/Caregiver Education & Training,Functional Mobility Training,Equipment Evaluation, Education, & procurement,Transfer Training,Gait Training,Stair training    Goals:  Patient goals : \"get rid of pain and get mobile again\"  Short term goals  Time Frame for Short term goals: by discharge  Short term goal 1: Pt will amb for >200 feet with IND with LRAD for support to progress towards PLOF. Short term goal 2: Pt will demo IND with transfers with LRAD for support to progress towards PLOF. Short term goal 3: Pt will demo IND with stair negotiation with 1 rail for support to progress towards PLOF. Long term goals  Time Frame for Long term goals : NA due to short ELOS    Following session, patient left in safe position with all fall risk precautions in place. Pt in bedside chair with nursing present, all needs and call light in reach.

## 2022-03-21 NOTE — DISCHARGE SUMMARY
Orthopedic Spine Discharge Summary      Patient Identification:   Neelam Garay   : [birth year]  MRN: [id number]   Account: [de-identified]      Patient's PCP: Bernice Ruano DO    Admit Date: 3/18/2022     Discharge Date: 3/21/2022    Admitting Physician: Taqueria Dupree MD     Discharge Provider: Tyler Alarcon PA-C , Dr. Isabel Garcia    Discharge Diagnoses:  Intractable LBP and RLE radiculopathy due to lumbar spinal stenosis    The patient was seen and examined on day of discharge and this discharge summary is in conjunction with any daily progress note from day of discharge. Operation Performed:  Right L4-L5 EFNRB by Dr. Orville Zamudio 3/21/22      Hospital Course: The patient is 77 y.o. male, who presented to our office having symptoms of intractable lumbar pain and right lower extremity radiculopathy which began after moving a dryer. Thresa Abed His symptoms were so severe he presented in a wheelchair. For this the patient was direct  admitted to the [facility] floor for a MRI LS and futher treatment. He was worked up with his MRI on 3/19/2022 which demonstrated moderate to severe spinal stenosis from L2-L5 along with a anterolisthesis of L2-L3. Symptoms were consistent with an L4 dermatome and the patient wished to proceed with conservative treatment with an pain management injection. We ordered him a right L4-L5 extraforaminal nerve root block which was completed by interventional radiologist, Dr. Orville Zamudio, on 3/21/2022. After his injection, he is overall feeling significantly improved and was ambulating well without any assistance. He was then discharged home with his family in the afternoon 3/21/2022. Throughout his stay he demonstrated very subtle weakness graded 4+/5 right-sided dorsiflexion with 5/5 on the left. Otherwise, history is maintained 5/5 in bilateral knee extension, EHL contraction and plantar flexion. Calves are soft and nontender with no evidence of DVT. Sensation was intact to light touch.   He did complain of constipation while taking opioids in which she was prescribed and discharged with bowel medications. He denied any abdominal pain, nausea or vomiting. Consults:     None    Disposition:  home    Condition at Discharge: Stable    Discharge Medications:   1. Flexeril  2. Percocet  3. Senokot  4. Glycolax        Discharge Instructions:  He will continue to advance activities as tolerated. We will plan to see him back in the office in roughly 2 to 3 weeks for reassessment and discuss clinical outcomes of his injection.         Please send copies to his primary care physician    Electronically signed by Tyler Alarcon PA-C on 3/21/2022

## 2022-03-21 NOTE — H&P
Formulation and discussion of sedation / procedure plans, risks, benefits, side effects and alternatives with patient and/or responsible adult completed.     Electronically signed by Lior Galvan MD on 3/21/22 at 9:38 AM EDT
times daily as needed for Muscle spasms Yes Historical Provider, MD   oxyCODONE-acetaminophen (PERCOCET) 5-325 MG per tablet Take 1 tablet by mouth every 4 hours as needed for Pain. Yes Historical Provider, MD   simvastatin (ZOCOR) 10 MG tablet Take 10 mg by mouth nightly Yes Historical Provider, MD     Past Medical History:   Diagnosis Date    Movement disorder      No past surgical history on file. Review of Systems   Constitutional: Positive for activity change. Negative for chills and fever. HENT: Negative. Respiratory: Negative. Cardiovascular: Negative. Gastrointestinal: Negative. Genitourinary: Negative. Musculoskeletal: Positive for back pain and gait problem. Skin: Negative. Neurological: Positive for weakness and numbness. Negative for dizziness and headaches. Physical Exam  Vitals reviewed. Constitutional:       Appearance: Normal appearance. Cardiovascular:      Pulses:           Posterior tibial pulses are 2+ on the right side and 2+ on the left side. Musculoskeletal:      Lumbar back: No swelling, tenderness or bony tenderness. Positive right straight leg raise test.   Skin:     General: Skin is warm and dry. Capillary Refill: Capillary refill takes 2 to 3 seconds. Neurological:      Mental Status: He is alert and oriented to person, place, and time. Sensory: Sensation is intact. Comments: Right Dorsiflexion, EHL 4/5  5/5 bilat Knee extension, plantar flexion  5/5 Left Dorsiflexion, EHL         Imaging:   No results found.